# Patient Record
Sex: MALE | Race: WHITE | NOT HISPANIC OR LATINO | Employment: OTHER | URBAN - METROPOLITAN AREA
[De-identification: names, ages, dates, MRNs, and addresses within clinical notes are randomized per-mention and may not be internally consistent; named-entity substitution may affect disease eponyms.]

---

## 2017-04-05 NOTE — PATIENT DISCUSSION
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 - Plan : see plan #2.

## 2017-04-05 NOTE — PATIENT DISCUSSION
(H40.013) Open angle with borderline findings, low risk, bilateral - Assesment : Examination revealed suspicion for Open Angle Glaucoma - OCT wnl OU today. - Plan : Monitor for changes.  Monitor with OCT prn IOP changes or Dr's request.

## 2017-04-05 NOTE — PATIENT DISCUSSION
(H25.13) Age-related nuclear cataract, bilateral - Assesment : Examination revealed cataract. - Plan : Monitor for changes. Advised patient  of condition of cataracts. Discussed symptoms of cataracts worsening and becoming more bothersome. Pt to call with vision changes or if becomes more bothered by visual symptoms before next visit. RTC in 1 year for Exam and OCT ONH, sooner if problems or changes occur.

## 2017-04-05 NOTE — PATIENT DISCUSSION
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 - Plan : Continue with Neuro Surgeon as scheduled.

## 2018-04-18 NOTE — PATIENT DISCUSSION
(H40.013) Open angle with borderline findings, low risk, bilateral - Assesment : Examination revealed suspicion for Open Angle Glaucoma . OCT wnl  and stable today.  - Plan : Monitor for IOP and NFL changes with visits and OCT prn IOP changes or Dr's request.

## 2018-04-18 NOTE — PATIENT DISCUSSION
(H25.13) Age-related nuclear cataract, bilateral - Assesment : Examination revealed cataract. - Plan : Monitor for changes. Advised patient of condition of cataracts and discussed symptoms of cataracts worsening and becoming more bothersome. Updated GLRx given today. Pt to call with vision changes or if becomes more bothered by visual symptoms before next visit. RTC in 1 year for Exam, sooner if problems or changes occur.

## 2018-04-18 NOTE — PATIENT DISCUSSION
(E11.9) Type 2 diabetes mellitus without complications - Assesment : Patient has diabetes with no ocular complications. - Plan : Continue care with PCP. Monitor blood sugar and A1C levels. Recommended yearly dilated eye exams to monitor for ocular complications. Letter with today's findings given to Pt to take to next appt with PCP [place].

## 2018-04-18 NOTE — PATIENT DISCUSSION
(G51.3) Clonic hemifacial spasm - Assesment : Hx of hemifacial spasm - s/p sx to repair. Doing well. - Plan : Monitor.

## 2018-06-21 ENCOUNTER — ESTABLISHED PATIENT (OUTPATIENT)
Dept: URBAN - METROPOLITAN AREA CLINIC 39 | Facility: CLINIC | Age: 58
End: 2018-06-21

## 2018-06-21 DIAGNOSIS — H43.393: ICD-10-CM

## 2018-06-21 DIAGNOSIS — H25.13: ICD-10-CM

## 2018-06-21 PROCEDURE — 92014 COMPRE OPH EXAM EST PT 1/>: CPT

## 2018-06-21 PROCEDURE — 92015 DETERMINE REFRACTIVE STATE: CPT

## 2018-06-21 ASSESSMENT — TONOMETRY
OD_IOP_MMHG: 14
OS_IOP_MMHG: 14

## 2018-06-21 ASSESSMENT — VISUAL ACUITY
OS_SC: J10
OD_SC: J8
OD_CC: J1
OS_SC: 20/30
OS_CC: J1
OD_SC: 20/30

## 2019-08-29 ENCOUNTER — ESTABLISHED COMPREHENSIVE EXAM (OUTPATIENT)
Dept: URBAN - METROPOLITAN AREA CLINIC 39 | Facility: CLINIC | Age: 59
End: 2019-08-29

## 2019-08-29 DIAGNOSIS — H25.13: ICD-10-CM

## 2019-08-29 DIAGNOSIS — H43.393: ICD-10-CM

## 2019-08-29 DIAGNOSIS — H43.811: ICD-10-CM

## 2019-08-29 PROCEDURE — 92014 COMPRE OPH EXAM EST PT 1/>: CPT

## 2019-08-29 PROCEDURE — 92015 DETERMINE REFRACTIVE STATE: CPT

## 2019-08-29 ASSESSMENT — VISUAL ACUITY
OS_CC: J1+
OD_SC: 20/30-1
OD_CC: J1+
OU_SC: 20/25+2
OS_SC: 20/30-2
OU_CC: J1+

## 2019-08-29 ASSESSMENT — TONOMETRY
OS_IOP_MMHG: 16
OD_IOP_MMHG: 16

## 2020-08-28 ENCOUNTER — ESTABLISHED COMPREHENSIVE EXAM (OUTPATIENT)
Dept: URBAN - METROPOLITAN AREA CLINIC 39 | Facility: CLINIC | Age: 60
End: 2020-08-28

## 2020-08-28 DIAGNOSIS — H52.03: ICD-10-CM

## 2020-08-28 DIAGNOSIS — H43.811: ICD-10-CM

## 2020-08-28 DIAGNOSIS — H43.393: ICD-10-CM

## 2020-08-28 DIAGNOSIS — H52.4: ICD-10-CM

## 2020-08-28 DIAGNOSIS — H25.13: ICD-10-CM

## 2020-08-28 PROCEDURE — 92014 COMPRE OPH EXAM EST PT 1/>: CPT

## 2020-08-28 ASSESSMENT — VISUAL ACUITY
OU_CC: J1+
OS_SC: 20/30-2
OU_SC: 20/25+2
OS_CC: J1+/-
OD_SC: 20/30-2
OD_CC: J1+

## 2020-08-28 ASSESSMENT — TONOMETRY
OD_IOP_MMHG: 16
OS_IOP_MMHG: 17

## 2022-03-29 ENCOUNTER — COMPREHENSIVE EXAM (OUTPATIENT)
Dept: URBAN - METROPOLITAN AREA CLINIC 39 | Facility: CLINIC | Age: 62
End: 2022-03-29

## 2022-03-29 DIAGNOSIS — H43.393: ICD-10-CM

## 2022-03-29 DIAGNOSIS — H43.811: ICD-10-CM

## 2022-03-29 DIAGNOSIS — H53.10: ICD-10-CM

## 2022-03-29 DIAGNOSIS — H25.13: ICD-10-CM

## 2022-03-29 PROCEDURE — 92014 COMPRE OPH EXAM EST PT 1/>: CPT

## 2022-03-29 ASSESSMENT — VISUAL ACUITY
OS_SC: 20/30
OS_SC: J5
OD_CC: J1+
OU_SC: J3
OS_CC: J1+
OD_SC: 20/30-1
OU_SC: 20/25-1
OD_SC: J3
OU_CC: J1+

## 2022-03-29 ASSESSMENT — TONOMETRY
OS_IOP_MMHG: 16
OD_IOP_MMHG: 16

## 2023-10-03 NOTE — PATIENT DISCUSSION
(E11.9) Type 2 diabetes mellitus without complications - Assesment : Patient has type II diabetes with no ocular complications. - Plan : Continue care with PCP. Monitor blood sugar and A1C levels. Recommended yearly dilated eye exams to monitor for ocular changes. Letter sent to PCP with today's findings. PROVIDER:[TOKEN:[[id number]],FOLLOWUP:[Routine],ESTABLISHEDPATIENT:[T]]

## 2024-04-02 ENCOUNTER — COMPREHENSIVE EXAM (OUTPATIENT)
Dept: URBAN - METROPOLITAN AREA CLINIC 39 | Facility: CLINIC | Age: 64
End: 2024-04-02

## 2024-04-02 DIAGNOSIS — H43.811: ICD-10-CM

## 2024-04-02 DIAGNOSIS — H25.13: ICD-10-CM

## 2024-04-02 DIAGNOSIS — H53.10: ICD-10-CM

## 2024-04-02 DIAGNOSIS — H43.393: ICD-10-CM

## 2024-04-02 PROCEDURE — 92014 COMPRE OPH EXAM EST PT 1/>: CPT

## 2024-04-02 ASSESSMENT — VISUAL ACUITY
OS_SC: 20/20-2
OD_CC: J1+
OD_SC: 20/20
OU_CC: J1+
OS_CC: J1+
OU_SC: 20/20

## 2024-04-02 ASSESSMENT — TONOMETRY
OD_IOP_MMHG: 17
OS_IOP_MMHG: 18

## 2025-05-06 ENCOUNTER — COMPREHENSIVE EXAM (OUTPATIENT)
Age: 65
End: 2025-05-06

## 2025-05-06 DIAGNOSIS — H43.811: ICD-10-CM

## 2025-05-06 DIAGNOSIS — H25.13: ICD-10-CM

## 2025-05-06 DIAGNOSIS — H43.393: ICD-10-CM

## 2025-05-06 DIAGNOSIS — H53.10: ICD-10-CM

## 2025-05-06 PROCEDURE — 92015 DETERMINE REFRACTIVE STATE: CPT

## 2025-05-06 PROCEDURE — 92014 COMPRE OPH EXAM EST PT 1/>: CPT
